# Patient Record
Sex: FEMALE | Race: WHITE | NOT HISPANIC OR LATINO | Employment: FULL TIME | URBAN - METROPOLITAN AREA
[De-identification: names, ages, dates, MRNs, and addresses within clinical notes are randomized per-mention and may not be internally consistent; named-entity substitution may affect disease eponyms.]

---

## 2017-07-06 ENCOUNTER — HOSPITAL ENCOUNTER (OUTPATIENT)
Facility: HOSPITAL | Age: 27
Discharge: HOME | End: 2017-07-06
Attending: OBSTETRICS & GYNECOLOGY | Admitting: OBSTETRICS & GYNECOLOGY

## 2017-07-06 VITALS
HEIGHT: 65 IN | BODY MASS INDEX: 34.66 KG/M2 | TEMPERATURE: 98.1 F | SYSTOLIC BLOOD PRESSURE: 107 MMHG | HEART RATE: 88 BPM | DIASTOLIC BLOOD PRESSURE: 58 MMHG | RESPIRATION RATE: 16 BRPM | WEIGHT: 208 LBS

## 2017-07-06 LAB
ALP SERPL-CCNC: 57 U/L (ref 25–100)
ALT SERPL W P-5'-P-CCNC: 12 U/L (ref 7–40)
AMPHET+METHAMPHET UR QL: NEGATIVE
AMPHETAMINES UR QL: NEGATIVE
AST SERPL-CCNC: 14 U/L (ref 0–33)
BARBITURATES UR QL SCN: NEGATIVE
BENZODIAZ UR QL SCN: NEGATIVE
BILIRUB SERPL-MCNC: 0.3 MG/DL (ref 0.3–1.2)
BUPRENORPHINE SERPL-MCNC: NEGATIVE NG/ML
CANNABINOIDS SERPL QL: NEGATIVE
COCAINE UR QL: NEGATIVE
CREAT BLD-MCNC: 0.5 MG/DL (ref 0.6–1.3)
DEPRECATED RDW RBC AUTO: 52 FL (ref 37–54)
ERYTHROCYTE [DISTWIDTH] IN BLOOD BY AUTOMATED COUNT: 14.7 % (ref 11.3–14.5)
HCT VFR BLD AUTO: 34 % (ref 34.5–44)
HGB BLD-MCNC: 11.4 G/DL (ref 11.5–15.5)
LDH SERPL-CCNC: 165 U/L (ref 120–246)
MCH RBC QN AUTO: 32.8 PG (ref 27–31)
MCHC RBC AUTO-ENTMCNC: 33.5 G/DL (ref 32–36)
MCV RBC AUTO: 97.7 FL (ref 80–99)
METHADONE UR QL SCN: NEGATIVE
OPIATES UR QL: NEGATIVE
OXYCODONE UR QL SCN: NEGATIVE
PCP UR QL SCN: NEGATIVE
PLATELET # BLD AUTO: 236 10*3/MM3 (ref 150–450)
PMV BLD AUTO: 9.1 FL (ref 6–12)
PROPOXYPH UR QL: NEGATIVE
RBC # BLD AUTO: 3.48 10*6/MM3 (ref 3.89–5.14)
TRICYCLICS UR QL SCN: NEGATIVE
URATE SERPL-MCNC: 4.3 MG/DL (ref 3.1–7.8)
WBC NRBC COR # BLD: 10.71 10*3/MM3 (ref 3.5–10.8)

## 2017-07-06 PROCEDURE — 83615 LACTATE (LD) (LDH) ENZYME: CPT | Performed by: OBSTETRICS & GYNECOLOGY

## 2017-07-06 PROCEDURE — 84550 ASSAY OF BLOOD/URIC ACID: CPT | Performed by: OBSTETRICS & GYNECOLOGY

## 2017-07-06 PROCEDURE — G0463 HOSPITAL OUTPT CLINIC VISIT: HCPCS

## 2017-07-06 PROCEDURE — 80306 DRUG TEST PRSMV INSTRMNT: CPT | Performed by: OBSTETRICS & GYNECOLOGY

## 2017-07-06 PROCEDURE — 84460 ALANINE AMINO (ALT) (SGPT): CPT | Performed by: OBSTETRICS & GYNECOLOGY

## 2017-07-06 PROCEDURE — 84450 TRANSFERASE (AST) (SGOT): CPT | Performed by: OBSTETRICS & GYNECOLOGY

## 2017-07-06 PROCEDURE — 84075 ASSAY ALKALINE PHOSPHATASE: CPT | Performed by: OBSTETRICS & GYNECOLOGY

## 2017-07-06 PROCEDURE — 85027 COMPLETE CBC AUTOMATED: CPT | Performed by: OBSTETRICS & GYNECOLOGY

## 2017-07-06 PROCEDURE — 82247 BILIRUBIN TOTAL: CPT | Performed by: OBSTETRICS & GYNECOLOGY

## 2017-07-06 PROCEDURE — 82565 ASSAY OF CREATININE: CPT | Performed by: OBSTETRICS & GYNECOLOGY

## 2017-07-06 PROCEDURE — 59025 FETAL NON-STRESS TEST: CPT

## 2017-07-06 PROCEDURE — 99218 PR INITIAL OBSERVATION CARE/DAY 30 MINUTES: CPT | Performed by: OBSTETRICS & GYNECOLOGY

## 2017-07-06 RX ORDER — RANITIDINE 150 MG/1
150 TABLET ORAL 2 TIMES DAILY
COMMUNITY

## 2017-07-06 RX ORDER — PRENATAL WITH FERROUS FUM AND FOLIC ACID 3080; 920; 120; 400; 22; 1.84; 3; 20; 10; 1; 12; 200; 27; 25; 2 [IU]/1; [IU]/1; MG/1; [IU]/1; MG/1; MG/1; MG/1; MG/1; MG/1; MG/1; UG/1; MG/1; MG/1; MG/1; MG/1
1 TABLET ORAL DAILY
COMMUNITY

## 2017-07-07 NOTE — NURSING NOTE
Pt discharge instructions given. All questions answered. Pt given numbers to 2 practices she wants to transfer her care to. Pt left floor with S/O. Pt declined wheelchair.

## 2017-07-07 NOTE — H&P
"[facility]  Obstetric History and Physical    Chief Complaint   Patient presents with   • Headache     \"Really bad headache, took Tylenol 650 mg at 2 pm and it didn't touch it\"   • Foot Swelling     Bilateral for the past 8 days, today it feels like skin is going to break open.       Subjective     Patient is a 27 y.o. female  currently at 24w5d, who presents with a complaint of bilateral leg swelling and a mild headache.  She describes having worsening swelling that occurred throughout the day.  She says that this has been happening for the last 8 days.   It does improve throughout the night when she rests.  Even tonight, it has improved with sitting in the bed.  She denies RUQ pain and vision changes.   Her HA is better, but not gone with Tylenol that she took at 14:00.  She mainly was concerned because she had pre-eclampsia in her first pregnancy starting [id number] weeks.   She is currently having her prenatal care at an outside hospital, but has now moved to [place] and is seeking an OB to transfer to here.        The following portions of the patients history were reviewed and updated as appropriate: current medications, allergies, past medical history, past surgical history, past family history, past social history and problem list .       Prenatal Information:   Maternal Prenatal Labs  Blood Type No results found for: ABO   Rh Status No results found for: RH   Antibody Screen No results found for: ABSCRN   Gonnorhea No results found for: GCCX   Chlamydia No results found for: CLAMYDCU   RPR No results found for: RPR   Syphilis Antibody No results found for: SYPHILIS   Rubella No results found for: RUBELLAIGGIN   Hepatitis B Surface Antigen No results found for: HEPBSAG   HIV-1 Antibody No results found for: LABHIV1   Hepatitis C Antibody No results found for: HEPCAB   Rapid Urin Drug Screen Barbiturates Screen, Urine   Date Value Ref Range Status   2017 Negative Negative Final "     Benzodiazepine Screen, Urine   Date Value Ref Range Status   2017 Negative Negative Final     Methadone Screen, Urine   Date Value Ref Range Status   2017 Negative Negative Final     Opiate Screen   Date Value Ref Range Status   2017 Negative Negative Final     THC, Screen, Urine   Date Value Ref Range Status   2017 Negative Negative Final     Cocaine Screen, Urine   Date Value Ref Range Status   2017 Negative Negative Final     Amphetamine Screen, Urine   Date Value Ref Range Status   2017 Negative Negative Final     Propoxyphene Screen   Date Value Ref Range Status   2017 Negative Negative Final     Buprenorphine, Screen, Urine   Date Value Ref Range Status   2017 Negative Negative Final     Methamphetamine, Urine   Date Value Ref Range Status   2017 Negative Negative Final     Oxycodone Screen, Urine   Date Value Ref Range Status   2017 Negative Negative Final     Tricyclic Antidepressants Screen   Date Value Ref Range Status   2017 Negative Negative Final      Group B Strep Culture No results found for: GBSANTIGEN                 Past OB History:     Obstetric History       T2      TAB0   SAB1   E0   M0   L2       # Outcome Date GA Lbr Sotero/2nd Weight Sex Delivery Anes PTL Lv   4 Current            3 Term 11 37w2d  6 lb 14 oz (3.118 kg) F Vag-Spont EPI N Y   2 SAB 07/06/10 6w0d          1 Term 10/30/08 37w0d  6 lb 14 oz (3.118 kg) M Vag-Vacuum EPI N Y          Past Medical History: Past Medical History:   Diagnosis Date   • Abnormal Pap smear of cervix     , normal after that   • Gestational diabetes     Second pregnancy   • Gestational hypertension     First pregnancy   • Migraine     Not taking medications during pregnancy      Past Surgical History Past Surgical History:   Procedure Laterality Date   • APPENDECTOMY         • CHOLECYSTECTOMY         • TONSILLECTOMY      At age 4      Family History: Family  History   Problem Relation Age of Onset   • Deep vein thrombosis Father    • Diabetes Father    • Hypertension Father    • Diabetes Mother    • Hypertension Mother    • Diabetes Paternal Grandmother    • Hypertension Paternal Grandmother    • Deep vein thrombosis Maternal Grandmother    • Coronary artery disease Maternal Grandmother    • Diabetes Maternal Grandmother    • Hypertension Maternal Grandmother    • Coronary artery disease Maternal Grandfather    • Diabetes Maternal Grandfather    • Hypertension Maternal Grandfather       Social History:  reports that she has never smoked. She has never used smokeless tobacco.   reports that she does not drink alcohol.   reports that she does not use illicit drugs.        Review of Systems  Reviewed with nurse in [facility]      Objective     Vital Signs Range for the last 24 hours  Temperature: Temp:  [98.2 °F (36.8 °C)] 98.2 °F (36.8 °C)   Temp Source: Temp src: Oral   BP: BP: (128)/(79) 128/79   Pulse: Heart Rate:  [85] 85   Respirations: Resp:  [18] 18   SPO2:     O2 Amount (l/min):     O2 Devices     Weight: Weight:  [208 lb (94.3 kg)] 208 lb (94.3 kg)     Physical Examination: General appearance - alert, well appearing, and in no distress and oriented to person, place, and time  Chest - clear to auscultation, no wheezes, rales or rhonchi, symmetric air entry  Heart - S1 and S2 normal, no murmurs noted  Abdomen - soft, nontender, nondistended, no masses or organomegaly  no rebound tenderness noted  bowel sounds normal  No guarding, No RUQ pain  Extremities - pedal edema 1+                          Fetal Heart Rate Assessment   Method: Fetal HR Assessment Method: external   Beats/min: Fetal HR (Beats/Min): 155   Baseline: Fetal HR Baseline: normal range (110-160 bpm)   Varibility: Fetal HR Variability: moderate (amplitude range 6 to 25 bpm)   Accels: Fetal HR Accelerations: greater than/equal to 15 bpm, lasting at least 15 seconds   Decels: Fetal HR Decelerations: absent    Tracing Category:       Uterine Assessment   Method: Method: palpation, TOCO (external toco transducer)   Frequency (min):     Ctx Count in 10 min:     Duration:     Intensity: Contraction Intensity: no contractions   Intensity by IUPC:     Resting Tone: Uterine Resting Tone: soft by palpation   Resting Tone by IUPC:           Laboratory Results:   Lab Results   Component Value Date     07/06/2017    HGB 11.4 (L) 07/06/2017    HCT 34.0 (L) 07/06/2017    WBC 10.71 07/06/2017     Lab Results   Component Value Date    HGB 11.4 (L) 07/06/2017     07/06/2017    AST 14 07/06/2017    ALT 12 07/06/2017     07/06/2017    URICACID 4.3 07/06/2017    BUN 12 03/27/2015    CREATININE 0.50 (L) 07/06/2017             Assessment:  1. Intrauterine pregnancy at 24w5d weeks gestation with reactive fetal status  2. Lower extremity swelling    Plan:  1. FHTs - reassuring  2. No signs or symptoms of pre-eclampsia    3. Given reassurance   4.  All questions have been answered.  5.  Will give name of physicians that may be will to take her on as a transfer patient.  6.  D/C home      Krzysztof Brito MD  7/6/2017  8:42 PM

## 2017-08-30 ENCOUNTER — HOSPITAL ENCOUNTER (OUTPATIENT)
Facility: HOSPITAL | Age: 27
Setting detail: OBSERVATION
Discharge: HOME | End: 2017-08-31
Attending: OBSTETRICS & GYNECOLOGY | Admitting: OBSTETRICS & GYNECOLOGY

## 2017-08-30 VITALS
DIASTOLIC BLOOD PRESSURE: 64 MMHG | HEIGHT: 65 IN | SYSTOLIC BLOOD PRESSURE: 121 MMHG | HEART RATE: 87 BPM | RESPIRATION RATE: 18 BRPM | TEMPERATURE: 98.4 F

## 2017-08-30 DIAGNOSIS — Z3A.32 32 WEEKS GESTATION OF PREGNANCY: Primary | ICD-10-CM

## 2017-08-30 PROBLEM — Z34.90 PREGNANCY: Status: ACTIVE | Noted: 2017-08-30

## 2017-08-30 LAB
BACTERIA UR QL AUTO: ABNORMAL /HPF
BILIRUB UR QL STRIP: NEGATIVE
CLARITY UR: ABNORMAL
COLOR UR: YELLOW
GLUCOSE UR STRIP-MCNC: NEGATIVE MG/DL
HGB UR QL STRIP.AUTO: NEGATIVE
HYALINE CASTS UR QL AUTO: ABNORMAL /LPF
KETONES UR QL STRIP: NEGATIVE
LEUKOCYTE ESTERASE UR QL STRIP.AUTO: NEGATIVE
NITRITE UR QL STRIP: NEGATIVE
PH UR STRIP.AUTO: 6.5 [PH] (ref 5–8)
PROT UR QL STRIP: NEGATIVE
RBC # UR: ABNORMAL /HPF
REF LAB TEST METHOD: ABNORMAL
SP GR UR STRIP: 1.02 (ref 1–1.03)
SQUAMOUS #/AREA URNS HPF: ABNORMAL /HPF
UROBILINOGEN UR QL STRIP: ABNORMAL
WBC UR QL AUTO: ABNORMAL /HPF

## 2017-08-30 PROCEDURE — G0378 HOSPITAL OBSERVATION PER HR: HCPCS

## 2017-08-30 PROCEDURE — 59025 FETAL NON-STRESS TEST: CPT

## 2017-08-30 PROCEDURE — 99213 OFFICE O/P EST LOW 20 MIN: CPT | Performed by: OBSTETRICS & GYNECOLOGY

## 2017-08-30 PROCEDURE — 81001 URINALYSIS AUTO W/SCOPE: CPT | Performed by: OBSTETRICS & GYNECOLOGY

## 2017-08-30 PROCEDURE — 59025 FETAL NON-STRESS TEST: CPT | Performed by: OBSTETRICS & GYNECOLOGY

## 2017-08-30 RX ORDER — NIFEDIPINE 10 MG/1
20 CAPSULE ORAL EVERY 8 HOURS SCHEDULED
Status: DISCONTINUED | OUTPATIENT
Start: 2017-08-30 | End: 2017-08-31 | Stop reason: HOSPADM

## 2017-08-30 RX ADMIN — NIFEDIPINE 20 MG: 10 CAPSULE, LIQUID FILLED ORAL at 23:43

## 2017-08-31 PROCEDURE — G0378 HOSPITAL OBSERVATION PER HR: HCPCS

## 2017-09-06 ENCOUNTER — HOSPITAL ENCOUNTER (OUTPATIENT)
Facility: HOSPITAL | Age: 27
Discharge: HOME | End: 2017-09-07
Attending: MIDWIFE | Admitting: MIDWIFE

## 2017-09-06 LAB
BILIRUB UR QL STRIP: NEGATIVE
CLARITY UR: CLEAR
COLOR UR: YELLOW
GLUCOSE UR STRIP-MCNC: NEGATIVE MG/DL
HGB UR QL STRIP.AUTO: NEGATIVE
KETONES UR QL STRIP: NEGATIVE
LEUKOCYTE ESTERASE UR QL STRIP.AUTO: NEGATIVE
NITRITE UR QL STRIP: NEGATIVE
PH UR STRIP.AUTO: 7 [PH] (ref 5–8)
PROT UR QL STRIP: NEGATIVE
SP GR UR STRIP: 1.02 (ref 1–1.03)
UROBILINOGEN UR QL STRIP: NORMAL

## 2017-09-06 PROCEDURE — 59025 FETAL NON-STRESS TEST: CPT

## 2017-09-06 PROCEDURE — 81003 URINALYSIS AUTO W/O SCOPE: CPT | Performed by: MIDWIFE

## 2017-09-06 PROCEDURE — G0463 HOSPITAL OUTPT CLINIC VISIT: HCPCS

## 2017-09-06 PROCEDURE — 59025 FETAL NON-STRESS TEST: CPT | Performed by: MIDWIFE

## 2017-09-06 RX ORDER — NIFEDIPINE 10 MG/1
20 CAPSULE ORAL ONCE
Status: COMPLETED | OUTPATIENT
Start: 2017-09-07 | End: 2017-09-06

## 2017-09-06 RX ADMIN — NIFEDIPINE 20 MG: 10 CAPSULE ORAL at 23:59

## 2017-09-07 VITALS
WEIGHT: 232 LBS | RESPIRATION RATE: 18 BRPM | HEIGHT: 65 IN | BODY MASS INDEX: 38.65 KG/M2 | DIASTOLIC BLOOD PRESSURE: 77 MMHG | HEART RATE: 102 BPM | TEMPERATURE: 98.4 F | SYSTOLIC BLOOD PRESSURE: 129 MMHG

## 2017-09-07 NOTE — NON STRESS TEST
"  Kimmy Corley, a  at 33w4d with an IRVIN of 10/21/2017, by Patient Reported, was seen at [facility] for a nonstress test.    Chief Complaint   Patient presents with   • Contractions     \"I'm having contractions and my head hurts. I've been joseline on and off for past two days.\"       Interpretation A  Nonstress Test Interpretation A: Reactive (17 1900 : Gm Bustos RN)        "